# Patient Record
Sex: FEMALE | Race: ASIAN | NOT HISPANIC OR LATINO
[De-identification: names, ages, dates, MRNs, and addresses within clinical notes are randomized per-mention and may not be internally consistent; named-entity substitution may affect disease eponyms.]

---

## 2018-01-18 ENCOUNTER — APPOINTMENT (OUTPATIENT)
Dept: GYNECOLOGIC ONCOLOGY | Facility: CLINIC | Age: 53
End: 2018-01-18
Payer: MEDICAID

## 2018-01-18 VITALS
DIASTOLIC BLOOD PRESSURE: 98 MMHG | WEIGHT: 190 LBS | HEIGHT: 64 IN | BODY MASS INDEX: 32.44 KG/M2 | SYSTOLIC BLOOD PRESSURE: 169 MMHG | HEART RATE: 100 BPM

## 2018-01-18 DIAGNOSIS — Z86.79 PERSONAL HISTORY OF OTHER DISEASES OF THE CIRCULATORY SYSTEM: ICD-10-CM

## 2018-01-18 PROCEDURE — 99205 OFFICE O/P NEW HI 60 MIN: CPT

## 2018-01-18 RX ORDER — LISINOPRIL AND HYDROCHLOROTHIAZIDE TABLETS 20; 12.5 MG/1; MG/1
20-12.5 TABLET ORAL
Qty: 30 | Refills: 0 | Status: ACTIVE | COMMUNITY
Start: 2017-09-25

## 2018-01-18 RX ORDER — CHROMIUM 200 MCG
1000 TABLET ORAL
Qty: 30 | Refills: 0 | Status: ACTIVE | COMMUNITY
Start: 2017-09-25

## 2018-01-26 ENCOUNTER — LABORATORY RESULT (OUTPATIENT)
Age: 53
End: 2018-01-26

## 2018-06-07 ENCOUNTER — APPOINTMENT (OUTPATIENT)
Dept: GYNECOLOGIC ONCOLOGY | Facility: CLINIC | Age: 53
End: 2018-06-07
Payer: MEDICAID

## 2018-06-07 VITALS
WEIGHT: 190 LBS | HEART RATE: 105 BPM | SYSTOLIC BLOOD PRESSURE: 160 MMHG | HEIGHT: 64 IN | BODY MASS INDEX: 32.44 KG/M2 | DIASTOLIC BLOOD PRESSURE: 94 MMHG

## 2018-06-07 PROCEDURE — 58100 BIOPSY OF UTERUS LINING: CPT

## 2018-06-07 PROCEDURE — 99213 OFFICE O/P EST LOW 20 MIN: CPT | Mod: 25

## 2018-06-19 LAB — CORE LAB BIOPSY: NORMAL

## 2018-09-12 ENCOUNTER — APPOINTMENT (OUTPATIENT)
Dept: GYNECOLOGIC ONCOLOGY | Facility: CLINIC | Age: 53
End: 2018-09-12
Payer: MEDICAID

## 2018-09-12 VITALS
HEART RATE: 101 BPM | HEIGHT: 64 IN | BODY MASS INDEX: 32.33 KG/M2 | SYSTOLIC BLOOD PRESSURE: 123 MMHG | WEIGHT: 189.38 LBS | DIASTOLIC BLOOD PRESSURE: 81 MMHG

## 2018-09-12 PROCEDURE — 99213 OFFICE O/P EST LOW 20 MIN: CPT | Mod: 25

## 2018-09-12 PROCEDURE — 58100 BIOPSY OF UTERUS LINING: CPT

## 2018-09-19 LAB — CORE LAB BIOPSY: NORMAL

## 2018-10-22 ENCOUNTER — RX RENEWAL (OUTPATIENT)
Age: 53
End: 2018-10-22

## 2019-04-04 ENCOUNTER — APPOINTMENT (OUTPATIENT)
Dept: GYNECOLOGIC ONCOLOGY | Facility: CLINIC | Age: 54
End: 2019-04-04
Payer: MEDICAID

## 2019-04-04 VITALS
HEART RATE: 91 BPM | SYSTOLIC BLOOD PRESSURE: 151 MMHG | BODY MASS INDEX: 32.52 KG/M2 | HEIGHT: 64 IN | WEIGHT: 190.5 LBS | DIASTOLIC BLOOD PRESSURE: 89 MMHG

## 2019-04-04 DIAGNOSIS — N85.01 BENIGN ENDOMETRIAL HYPERPLASIA: ICD-10-CM

## 2019-04-04 PROCEDURE — 99213 OFFICE O/P EST LOW 20 MIN: CPT | Mod: 25

## 2019-04-04 PROCEDURE — 58100 BIOPSY OF UTERUS LINING: CPT

## 2019-04-10 LAB — CORE LAB BIOPSY: NORMAL

## 2022-03-29 ENCOUNTER — APPOINTMENT (OUTPATIENT)
Dept: UROGYNECOLOGY | Facility: CLINIC | Age: 57
End: 2022-03-29
Payer: MEDICAID

## 2022-03-29 VITALS
SYSTOLIC BLOOD PRESSURE: 136 MMHG | HEIGHT: 65 IN | TEMPERATURE: 97.3 F | DIASTOLIC BLOOD PRESSURE: 80 MMHG | BODY MASS INDEX: 31.65 KG/M2 | WEIGHT: 190 LBS

## 2022-03-29 DIAGNOSIS — Z82.49 FAMILY HISTORY OF ISCHEMIC HEART DISEASE AND OTHER DISEASES OF THE CIRCULATORY SYSTEM: ICD-10-CM

## 2022-03-29 DIAGNOSIS — N89.8 OTHER SPECIFIED NONINFLAMMATORY DISORDERS OF VAGINA: ICD-10-CM

## 2022-03-29 PROCEDURE — 99203 OFFICE O/P NEW LOW 30 MIN: CPT

## 2022-03-29 NOTE — HISTORY OF PRESENT ILLNESS
[Cystocele (Obstetric)] : no [Rectal Prolapse] : no [Stress Incontinence] : no [Unable To Restrain Bowel Movement] : no [Urinary Frequency] : no [Feelings Of Urinary Urgency] : no [x1] : nocturia once nightly [Urinary Tract Infection] : no [Constipation Obstructed Defecation] : no [Vaginal Pain] : no [] : no [FreeTextEntry1] : Christie presents today for evaluation of periurethral cyst. Patient states that she first noticed the cyst 1 month ago while showering. Denies any pain, dysuria, vaginal burning, pressure, irritation. States that the cyst grew in size. Was seen by her GYN and referred to this clinic. Denies hx of STI's. Currently sexually active without dyspareunia. Denies stress/urge incontinence, urge symptoms, or pelvic pressure or bulge.

## 2022-03-29 NOTE — PHYSICAL EXAM
[Chaperone Present] : A chaperone was present in the examining room during all aspects of the physical examination [Rate & Rhythm Regular] : ~T heart rate and rhythm were normal [Warm and Dry] : was warm and dry to touch [Labia Majora] : were normal [Labia Minora] : were normal [2] : 2 [Normal] : normal [No Acute Distress] : in no acute distress [Well developed] : well developed [Well Nourished] : ~L well nourished [Normal Appearance] : ~T the appearance of the neck was abnormal [Mass (___ Cm)] : no ~M [unfilled] abdominal mass was palpated [Tenderness] : ~T no ~M abdominal tenderness observed [Distended] : not distended [FreeTextEntry4] : + 1cm x 1cm in size vaginal cyst inferior to the left under the pubic ramus distally towards the urethral meatus [de-identified] : no prolapse

## 2022-04-27 ENCOUNTER — APPOINTMENT (OUTPATIENT)
Dept: MRI IMAGING | Facility: CLINIC | Age: 57
End: 2022-04-27
Payer: MEDICAID

## 2022-04-27 ENCOUNTER — OUTPATIENT (OUTPATIENT)
Dept: OUTPATIENT SERVICES | Facility: HOSPITAL | Age: 57
LOS: 1 days | End: 2022-04-27
Payer: MEDICAID

## 2022-04-27 DIAGNOSIS — N89.8 OTHER SPECIFIED NONINFLAMMATORY DISORDERS OF VAGINA: ICD-10-CM

## 2022-04-27 DIAGNOSIS — Z00.8 ENCOUNTER FOR OTHER GENERAL EXAMINATION: ICD-10-CM

## 2022-04-27 PROCEDURE — 72197 MRI PELVIS W/O & W/DYE: CPT

## 2022-04-27 PROCEDURE — 72197 MRI PELVIS W/O & W/DYE: CPT | Mod: 26

## 2022-04-27 PROCEDURE — A9585: CPT

## 2022-05-02 ENCOUNTER — NON-APPOINTMENT (OUTPATIENT)
Age: 57
End: 2022-05-02

## 2022-05-12 ENCOUNTER — APPOINTMENT (OUTPATIENT)
Dept: UROLOGY | Facility: CLINIC | Age: 57
End: 2022-05-12
Payer: MEDICAID

## 2022-05-12 VITALS
WEIGHT: 182 LBS | RESPIRATION RATE: 18 BRPM | HEART RATE: 94 BPM | BODY MASS INDEX: 30.32 KG/M2 | DIASTOLIC BLOOD PRESSURE: 102 MMHG | SYSTOLIC BLOOD PRESSURE: 154 MMHG | HEIGHT: 65 IN

## 2022-05-12 PROCEDURE — 99204 OFFICE O/P NEW MOD 45 MIN: CPT

## 2022-05-12 RX ORDER — MEDROXYPROGESTERONE ACETATE 10 MG/1
10 TABLET ORAL
Qty: 48 | Refills: 0 | Status: COMPLETED | COMMUNITY
Start: 2018-01-30 | End: 2022-05-12

## 2022-05-22 NOTE — PHYSICAL EXAM
[General Appearance - Well Developed] : well developed [General Appearance - Well Nourished] : well nourished [Normal Appearance] : normal appearance [Well Groomed] : well groomed [General Appearance - In No Acute Distress] : no acute distress [Edema] : no peripheral edema [Respiration, Rhythm And Depth] : normal respiratory rhythm and effort [Exaggerated Use Of Accessory Muscles For Inspiration] : no accessory muscle use [Abdomen Soft] : soft [Abdomen Tenderness] : non-tender [Costovertebral Angle Tenderness] : no ~M costovertebral angle tenderness [Urinary Bladder Findings] : the bladder was normal on palpation [Normal Station and Gait] : the gait and station were normal for the patient's age [] : no rash [No Focal Deficits] : no focal deficits [Oriented To Time, Place, And Person] : oriented to person, place, and time [Affect] : the affect was normal [Mood] : the mood was normal [Not Anxious] : not anxious [FreeTextEntry1] : Solid palpable mass in the left periurethral/anterior vagina, non tender, firm. [No Palpable Adenopathy] : no palpable adenopathy

## 2022-05-22 NOTE — ASSESSMENT
[FreeTextEntry1] : Exam findings reviewed with the patient and her .\par MRI images reviewed.\par Recommend to undergo transvaginal biopsy. Will discuss whether to do in the office versus under sedation.\par Discussed possible etiology including both benign and malignant lesions.

## 2022-05-22 NOTE — HISTORY OF PRESENT ILLNESS
[FreeTextEntry1] : Patient is a 56 year old woman presents to the office today for a urethral/vaginal mass\par \par Reports that GYN palpated a mass, nontender.\par Denies any bothersome urinary symptoms. Denies any dysuria, dyspareunia or dribbling. Denies gross hematuria. No recurrent UTI.  No urethral discharge.  No prior pelvic surgery.\par \par 4/27/2022 MRI pelvis w/wo contrast demonstrated 3 x 2.7 x 2 enhancing solid lesion adjacent to the urethral meatus.

## 2022-06-02 DIAGNOSIS — N36.8 OTHER SPECIFIED DISORDERS OF URETHRA: ICD-10-CM

## 2022-06-08 ENCOUNTER — OUTPATIENT (OUTPATIENT)
Dept: OUTPATIENT SERVICES | Facility: HOSPITAL | Age: 57
LOS: 1 days | End: 2022-06-08
Payer: MEDICAID

## 2022-06-08 VITALS
WEIGHT: 199.96 LBS | SYSTOLIC BLOOD PRESSURE: 168 MMHG | OXYGEN SATURATION: 97 % | HEIGHT: 65 IN | TEMPERATURE: 99 F | HEART RATE: 100 BPM | DIASTOLIC BLOOD PRESSURE: 100 MMHG | RESPIRATION RATE: 16 BRPM

## 2022-06-08 DIAGNOSIS — N36.8 OTHER SPECIFIED DISORDERS OF URETHRA: ICD-10-CM

## 2022-06-08 DIAGNOSIS — D25.9 LEIOMYOMA OF UTERUS, UNSPECIFIED: Chronic | ICD-10-CM

## 2022-06-08 DIAGNOSIS — R52 PAIN, UNSPECIFIED: ICD-10-CM

## 2022-06-08 DIAGNOSIS — I10 ESSENTIAL (PRIMARY) HYPERTENSION: ICD-10-CM

## 2022-06-08 LAB
A1C WITH ESTIMATED AVERAGE GLUCOSE RESULT: 5.8 % — HIGH (ref 4–5.6)
ANION GAP SERPL CALC-SCNC: 13 MMOL/L — SIGNIFICANT CHANGE UP (ref 7–14)
BUN SERPL-MCNC: 10 MG/DL — SIGNIFICANT CHANGE UP (ref 7–23)
CALCIUM SERPL-MCNC: 10.4 MG/DL — SIGNIFICANT CHANGE UP (ref 8.4–10.5)
CHLORIDE SERPL-SCNC: 100 MMOL/L — SIGNIFICANT CHANGE UP (ref 98–107)
CO2 SERPL-SCNC: 26 MMOL/L — SIGNIFICANT CHANGE UP (ref 22–31)
CREAT SERPL-MCNC: 0.88 MG/DL — SIGNIFICANT CHANGE UP (ref 0.5–1.3)
EGFR: 77 ML/MIN/1.73M2 — SIGNIFICANT CHANGE UP
ESTIMATED AVERAGE GLUCOSE: 120 — SIGNIFICANT CHANGE UP
GLUCOSE SERPL-MCNC: 116 MG/DL — HIGH (ref 70–99)
HCG SERPL-ACNC: <5 MIU/ML — SIGNIFICANT CHANGE UP
HCT VFR BLD CALC: 41.6 % — SIGNIFICANT CHANGE UP (ref 34.5–45)
HGB BLD-MCNC: 13.4 G/DL — SIGNIFICANT CHANGE UP (ref 11.5–15.5)
MCHC RBC-ENTMCNC: 27.2 PG — SIGNIFICANT CHANGE UP (ref 27–34)
MCHC RBC-ENTMCNC: 32.2 GM/DL — SIGNIFICANT CHANGE UP (ref 32–36)
MCV RBC AUTO: 84.4 FL — SIGNIFICANT CHANGE UP (ref 80–100)
NRBC # BLD: 0 /100 WBCS — SIGNIFICANT CHANGE UP
NRBC # FLD: 0 K/UL — SIGNIFICANT CHANGE UP
PLATELET # BLD AUTO: 303 K/UL — SIGNIFICANT CHANGE UP (ref 150–400)
POTASSIUM SERPL-MCNC: 3.5 MMOL/L — SIGNIFICANT CHANGE UP (ref 3.5–5.3)
POTASSIUM SERPL-SCNC: 3.5 MMOL/L — SIGNIFICANT CHANGE UP (ref 3.5–5.3)
RBC # BLD: 4.93 M/UL — SIGNIFICANT CHANGE UP (ref 3.8–5.2)
RBC # FLD: 13.3 % — SIGNIFICANT CHANGE UP (ref 10.3–14.5)
SODIUM SERPL-SCNC: 139 MMOL/L — SIGNIFICANT CHANGE UP (ref 135–145)
WBC # BLD: 7.62 K/UL — SIGNIFICANT CHANGE UP (ref 3.8–10.5)
WBC # FLD AUTO: 7.62 K/UL — SIGNIFICANT CHANGE UP (ref 3.8–10.5)

## 2022-06-08 PROCEDURE — 93010 ELECTROCARDIOGRAM REPORT: CPT

## 2022-06-08 RX ORDER — SODIUM CHLORIDE 9 MG/ML
1000 INJECTION, SOLUTION INTRAVENOUS
Refills: 0 | Status: DISCONTINUED | OUTPATIENT
Start: 2022-06-14 | End: 2022-06-28

## 2022-06-08 RX ORDER — LISINOPRIL/HYDROCHLOROTHIAZIDE 10-12.5 MG
1 TABLET ORAL
Qty: 0 | Refills: 0 | DISCHARGE

## 2022-06-08 NOTE — H&P PST ADULT - NS PRO LAST MENSTRUAL DATE
Feb. 2022 Feb. 2022; sent [facility]; given urine cup to bring specimen the am of surgery

## 2022-06-08 NOTE — H&P PST ADULT - NSICDXPASTMEDICALHX_GEN_ALL_CORE_FT
PAST MEDICAL HISTORY:  HTN (hypertension)     Pain abiola - urethral/ vaginal mass in June 2022    Uterine fibroid     Vitamin D deficiency      PAST MEDICAL HISTORY:  History of prediabetes     HTN (hypertension)     Pain abiola - urethral/ vaginal mass in June 2022    Uterine fibroid     Vitamin D deficiency

## 2022-06-08 NOTE — H&P PST ADULT - PROBLEM SELECTOR PLAN 1
This is a 58 y/o female who is scheduled for biopsy of abiola-urethral/vaginal mass on 6-14-22  * Order covid-19 PCR and gave patient sheet with sites and numbers  * Given preop instructions

## 2022-06-08 NOTE — H&P PST ADULT - HISTORY OF PRESENT ILLNESS
This is  a 56 y/o female who presents with abiola-urethral/vaginal mass. Evaluated by MD who recommended intervention. Scheduled for biopsy of abiola-urethral/vaginal mass

## 2022-06-08 NOTE — H&P PST ADULT - PROBLEM SELECTOR PLAN 2
Await medical evaluation with pcp due to elevated BP at PAST Office--notified surgeon via email  * Instructed patient to take normal am dose of lisinopril-HCTZ the am of surgery

## 2022-06-13 ENCOUNTER — TRANSCRIPTION ENCOUNTER (OUTPATIENT)
Age: 57
End: 2022-06-13

## 2022-06-13 NOTE — ASU PATIENT PROFILE, ADULT - FALL HARM RISK - UNIVERSAL INTERVENTIONS
Bed in lowest position, wheels locked, appropriate side rails in place/Call bell, personal items and telephone in reach/Instruct patient to call for assistance before getting out of bed or chair/Non-slip footwear when patient is out of bed/[place] to call system/Physically safe environment - no spills, clutter or unnecessary equipment/Purposeful Proactive Rounding/Room/bathroom lighting operational, light cord in reach

## 2022-06-13 NOTE — ASU PATIENT PROFILE, ADULT - NSICDXPASTMEDICALHX_GEN_ALL_CORE_FT
PAST MEDICAL HISTORY:  History of prediabetes     HTN (hypertension)     Pain abiola - urethral/ vaginal mass in June 2022    Uterine fibroid     Vitamin D deficiency

## 2022-06-13 NOTE — ASU PATIENT PROFILE, ADULT - PAIN SCALE PREFERRED, PROFILE
Date & Time: 4/13/2024, 1:51 PM  Patient: Erasmo Vega  Encounter Provider(s):    Terrance Garcia APRN       To Whom It May Concern:    Erasmo Vega was seen and treated in our department on 4/13/2024. He can return to school on Monday 04/15/2024.    If you have any questions or concerns, please do not hesitate to call.      HOWARD SilvermanP-BC  _____________________________  Physician/APC Signature            numerical [phone]

## 2022-06-14 ENCOUNTER — OUTPATIENT (OUTPATIENT)
Dept: OUTPATIENT SERVICES | Facility: HOSPITAL | Age: 57
LOS: 1 days | Discharge: HOME | End: 2022-06-14
Payer: MEDICAID

## 2022-06-14 ENCOUNTER — RESULT REVIEW (OUTPATIENT)
Age: 57
End: 2022-06-14

## 2022-06-14 ENCOUNTER — TRANSCRIPTION ENCOUNTER (OUTPATIENT)
Age: 57
End: 2022-06-14

## 2022-06-14 ENCOUNTER — APPOINTMENT (OUTPATIENT)
Dept: UROLOGY | Facility: HOSPITAL | Age: 57
End: 2022-06-14

## 2022-06-14 VITALS
DIASTOLIC BLOOD PRESSURE: 72 MMHG | RESPIRATION RATE: 18 BRPM | OXYGEN SATURATION: 100 % | HEART RATE: 80 BPM | SYSTOLIC BLOOD PRESSURE: 125 MMHG

## 2022-06-14 VITALS
OXYGEN SATURATION: 99 % | DIASTOLIC BLOOD PRESSURE: 86 MMHG | WEIGHT: 199.96 LBS | HEART RATE: 91 BPM | TEMPERATURE: 98 F | RESPIRATION RATE: 14 BRPM | HEIGHT: 65 IN | SYSTOLIC BLOOD PRESSURE: 157 MMHG

## 2022-06-14 DIAGNOSIS — N89.8 OTHER SPECIFIED NONINFLAMMATORY DISORDERS OF VAGINA: ICD-10-CM

## 2022-06-14 DIAGNOSIS — N36.8 OTHER SPECIFIED DISORDERS OF URETHRA: ICD-10-CM

## 2022-06-14 DIAGNOSIS — D25.9 LEIOMYOMA OF UTERUS, UNSPECIFIED: Chronic | ICD-10-CM

## 2022-06-14 LAB — HCG UR QL: NEGATIVE — SIGNIFICANT CHANGE UP

## 2022-06-14 PROCEDURE — 88305 TISSUE EXAM BY PATHOLOGIST: CPT | Mod: 26

## 2022-06-14 PROCEDURE — 88342 IMHCHEM/IMCYTCHM 1ST ANTB: CPT | Mod: 26

## 2022-06-14 PROCEDURE — 88341 IMHCHEM/IMCYTCHM EA ADD ANTB: CPT | Mod: 26

## 2022-06-14 PROCEDURE — 53200 BIOPSY OF URETHRA: CPT

## 2022-06-14 RX ORDER — ONDANSETRON 8 MG/1
4 TABLET, FILM COATED ORAL ONCE
Refills: 0 | Status: DISCONTINUED | OUTPATIENT
Start: 2022-06-14 | End: 2022-06-28

## 2022-06-14 RX ORDER — SODIUM CHLORIDE 9 MG/ML
1000 INJECTION, SOLUTION INTRAVENOUS
Refills: 0 | Status: DISCONTINUED | OUTPATIENT
Start: 2022-06-14 | End: 2022-06-28

## 2022-06-14 RX ORDER — HYDROMORPHONE HYDROCHLORIDE 2 MG/ML
0.5 INJECTION INTRAMUSCULAR; INTRAVENOUS; SUBCUTANEOUS
Refills: 0 | Status: DISCONTINUED | OUTPATIENT
Start: 2022-06-14 | End: 2022-06-14

## 2022-06-14 RX ADMIN — SODIUM CHLORIDE 100 MILLILITER(S): 9 INJECTION, SOLUTION INTRAVENOUS at 12:01

## 2022-06-14 NOTE — ASU DISCHARGE PLAN (ADULT/PEDIATRIC) - NURSING INSTRUCTIONS
Please report any signs and symptoms of infection including Fever (Temp >101 or >100.4 if GYN procedure), uncontrollable nausea, vomiting, diarrhea, chills & inability to urinate. Shower with soap & water when appropriate, pat dry with clean towel & no ointments, creams, powders or lotions on incisions unless okayed by MD. Please report any puss or increased drainage from incision sites, or if redness develops and spreads around sites. Please practice good hand hygiene especially after using the bathroom. Follow up with all MD appointments and take medication(s) as prescribed  Nothing in vagina, no intercourse, no douching, no tampons, no tub baths, and no swimming for 2 weeks or until cleared by M.D.

## 2022-06-14 NOTE — BRIEF OPERATIVE NOTE - OPERATION/FINDINGS
Slightly left of midline mass palpable between urethral meatus and pubis. Biopsied with trucut needle x3. Frozen favors benign pathology.

## 2022-06-14 NOTE — BRIEF OPERATIVE NOTE - NSICDXBRIEFOPLAUNCH_GEN_ALL_CORE
Detail Level: Simple
Additional Notes: Patient consent was obtained to proceed with the visit and recommended plan of care after discussion of all risks and benefits, including the risks of COVID-19 exposure.
<--- Click to Launch ICDx for PreOp, PostOp and Procedure

## 2022-06-14 NOTE — ASU DISCHARGE PLAN (ADULT/PEDIATRIC) - FOLLOW UP APPOINTMENTS
[facility] may also call Recovery Room (PACU) 24/7 @ [phone]/[facility]

## 2022-06-14 NOTE — ASU DISCHARGE PLAN (ADULT/PEDIATRIC) - ASU DC SPECIAL INSTRUCTIONSFT
Your initial biopsy results showed no concerning findings. Final results will take approximately 7-10 business days.    Dr. Montoya's office will call with final results.    Follow-up per routine.

## 2022-06-14 NOTE — ASU DISCHARGE PLAN (ADULT/PEDIATRIC) - NS MD DC FALL RISK RISK
For information on Fall & Injury Prevention, visit: [email or web address] OR  [email or web address] OR  https://www.cdc.gov/steadi/patient.html

## 2022-06-14 NOTE — ASU DISCHARGE PLAN (ADULT/PEDIATRIC) - CARE PROVIDER_API CALL
Rick Montoya)  Urology  [address] [place] [address], [place] [address]  Phone: [phone]  Fax: [phone]  Follow Up Time:

## 2022-06-14 NOTE — ASU DISCHARGE PLAN (ADULT/PEDIATRIC) - CALL YOUR DOCTOR IF YOU HAVE ANY OF THE FOLLOWING:
Bleeding that does not stop/Fever greater than (need to indicate Fahrenheit or Celsius) Bleeding that does not stop/Pain not relieved by Medications/Fever greater than (need to indicate Fahrenheit or Celsius)/Wound/Surgical Site with redness, or foul smelling discharge or pus/Nausea and vomiting that does not stop/Unable to urinate/Inability to tolerate liquids or foods/Increased irritability or sluggishness

## 2022-06-15 LAB — GLUCOSE BLDC GLUCOMTR-MCNC: 110 MG/DL — HIGH (ref 70–99)

## 2022-06-23 LAB — SURGICAL PATHOLOGY STUDY: SIGNIFICANT CHANGE UP

## 2023-02-16 PROBLEM — R52 PAIN, UNSPECIFIED: Chronic | Status: ACTIVE | Noted: 2022-06-08

## 2023-02-16 PROBLEM — Z87.898 PERSONAL HISTORY OF OTHER SPECIFIED CONDITIONS: Chronic | Status: ACTIVE | Noted: 2022-06-08

## 2023-03-24 ENCOUNTER — APPOINTMENT (OUTPATIENT)
Dept: PULMONOLOGY | Facility: CLINIC | Age: 58
End: 2023-03-24
Payer: MEDICAID

## 2023-03-24 VITALS
BODY MASS INDEX: 30.99 KG/M2 | SYSTOLIC BLOOD PRESSURE: 139 MMHG | WEIGHT: 186 LBS | HEART RATE: 109 BPM | HEIGHT: 65 IN | DIASTOLIC BLOOD PRESSURE: 89 MMHG

## 2023-03-24 PROCEDURE — 94729 DIFFUSING CAPACITY: CPT

## 2023-03-24 PROCEDURE — 94726 PLETHYSMOGRAPHY LUNG VOLUMES: CPT

## 2023-03-24 PROCEDURE — 99205 OFFICE O/P NEW HI 60 MIN: CPT | Mod: 25

## 2023-03-24 PROCEDURE — 94060 EVALUATION OF WHEEZING: CPT

## 2023-03-24 PROCEDURE — ZZZZZ: CPT

## 2023-03-24 NOTE — CONSULT LETTER
[Dear  ___] : Dear  [unfilled], [Consult Letter:] : I had the pleasure of evaluating your patient, [unfilled]. [Please see my note below.] : Please see my note below. [Sincerely,] : Sincerely, [FreeTextEntry3] : John Sadler MD, FACP, FCCP\par Division of Pulmonary, Critical Care, and Sleep Medicine\par Associate Professor of Medicine \par [employer] at [facility]\par

## 2023-03-24 NOTE — REVIEW OF SYSTEMS
[Ear Disturbance] : ear disturbance [Nasal Congestion] : nasal congestion [Postnasal Drip] : postnasal drip [Cough] : cough [Wheezing] : wheezing [Fever] : no fever [Fatigue] : no fatigue [EDS] : no EDS [Chills] : no chills [Sore Throat] : no sore throat [Eye Irritation] : no eye irritation [Sinus Problems] : no sinus problems [Hemoptysis] : no hemoptysis [Chest Tightness] : no chest tightness [Frequent URIs] : no frequent URIs [Sputum] : no sputum [Dyspnea] : no dyspnea [Claudication] : no claudication [Edema] : no edema [Leg Cramps] : no leg cramps [Watery Eyes] : no watery eyes [Itchy Eyes] : no itchy eyes [GERD] : no gerd [Abdominal Pain] : no abdominal pain [Nausea] : no nausea [Vomiting] : no vomiting [Dysuria] : no dysuria [Arthralgias] : no arthralgias [Myalgias] : no myalgias [Rash] : no rash [Headache] : no headache [Focal Weakness] : no focal weakness [Seizures] : no seizures [Head Injury] : no head injury [Depression] : no depression [Anxiety] : no anxiety [Diabetes] : no diabetes [Obesity] : no obesity

## 2023-03-24 NOTE — PHYSICAL EXAM
[No Acute Distress] : no acute distress [Well Nourished] : well nourished [Well Groomed] : well groomed [No Deformities] : no deformities [Well Developed] : well developed [Normal Oropharynx] : normal oropharynx [Normal Appearance] : normal appearance [Supple] : supple [No Neck Mass] : no neck mass [Normal Rate/Rhythm] : normal rate/rhythm [Normal S1, S2] : normal s1, s2 [No Resp Distress] : no resp distress [No Acc Muscle Use] : no acc muscle use [Benign] : benign [Soft] : soft [Normal Gait] : normal gait [No Clubbing] : no clubbing [No Cyanosis] : no cyanosis [No Edema] : no edema [FROM] : FROM [Normal Color/ Pigmentation] : normal color/ pigmentation [No Focal Deficits] : no focal deficits [Oriented x3] : oriented x3 [Normal Affect] : normal affect [TextBox_11] : NCAT, EOMI, anicteric, MMM, nares clear, trachea midline, no thrush [TextBox_68] : expiratory wheeze, no rhonchi, no egophany

## 2023-03-24 NOTE — ASSESSMENT
[FreeTextEntry1] : 57F HTN presenting for initial pulmonary evaluation of cough which has been present for the last 2-3 months. She had PFTs which demonstrate a significant bronchodilator response in FEV1 and FVC\par \par #Cough - possibly due to cough variant asthma or reactive airway disease triggered by initial viral URI - will start bronchodilator therapy. \par - CXR without infiltrates - if cough persists after therapy may need repeat imaging\par - PFTs noted\par - Continue Flonase for component of postnasal drip\par - If no improvement with therapy and no imaging abnormalities may need to consider whether Lisinopril is contributing to cough though patient has been on this therapy for many years\par \par #Asthma - suspected cough variant asthma - significant bronchodilator response noted\par - Denies allergies or triggers at this point\par - Will start Trelegy daily - sample provided and patient instructed on use. Patient will notify our office if unable to obtain bronchodilator\par - Albuterol rescue inhaler for PRN use\par - Does not require systemic steroids at this time\par - If symptoms persist despite bronchodilator therapy will check serologic workup at next visit\par \par #Snoring - patient does admit to snoring but denies daytime somnolence\par - I have referred her to the [facility] for a diagnostic home sleep apnea test\par \par #Health Maintenance\par Spirometry: Reviewed \par Smoking status: Never smoker \par Pneumococcal vaccination status: N/A \par [place] Sleepiness Scale: 2 (3/24/23)\par \par #Follow-up in 2-4 months or sooner as needed\par - All questions answered\par \par \par The above plan was discussed with TARA PANDYA  in detail. Patient verbalized understanding and agrees with plan as detailed above. Patient was provided education and counselling on current diagnosis/symptoms, diagnostic work up, treatment options and potential side effects of any prescribed therapy/therapies. TARA  was advised to call our clinic at [phone] for any new or worsening symptoms, or with any questions or concerns. In case of acute onset of respiratory symptoms or worsening presentation, patient was advised to present to nearest emergency room for further evaluation. TARA  expressed understanding and all questions/concerns were addressed.\par \par

## 2023-03-24 NOTE — HISTORY OF PRESENT ILLNESS
[Never] : never [TextBox_4] : 57F HTN presenting for initial pulmonary evaluation of cough which has been present for the last 2-3 months. The cough initially presented after having a flu-like illness around New Years that many of her family members had as well. She initially noted a scratchy throat and runny nose and then progressed to cough.\par \par The cough persists and is a dry cough. It comes at all times of the day and she has not noted any alleviating factors. She has been given a trial of Flonase without much relief. She uses the Flonase once daily. She has not noted any change in her voice but does endorse throat clearing. Given her symptoms she had a CXR done January 16, 2023 which did not note any acute cardiopulmonary pathology (done at [facility]).\par \par She has no prior history of lung disease. She denies any family history of lung disease. Her cough is dry and she does not note sputum production or hemoptysis. She denies chest pain or palpitations. She denies nausea, vomiting, or abdominal pain. She denies any other changes to her health. She does not use any new medications aside from Flonase. She has been on Lisinopril for a long time.\par \par She is a never smoker. She is originally from [facility] but has been in the [place] for many years. She does not have any pets. 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 There is no pneumonia, pulmonary edema, pleural effusion, or pneumothorax. \par \par The cardiac silhouette is not enlarged.\par \par The pulmonary kiera and mediastinal contours are within normal limits. \par \par There is no gross evidence for thoracic vertebral compression fracture. No gross osseous abnormality.  \par \par IMPRESSION:  No acute cardiopulmonary pathology.

## 2023-05-03 ENCOUNTER — RX RENEWAL (OUTPATIENT)
Age: 58
End: 2023-05-03

## 2023-06-13 ENCOUNTER — RX CHANGE (OUTPATIENT)
Age: 58
End: 2023-06-13

## 2023-06-14 ENCOUNTER — RX CHANGE (OUTPATIENT)
Age: 58
End: 2023-06-14

## 2023-06-19 ENCOUNTER — APPOINTMENT (OUTPATIENT)
Dept: PULMONOLOGY | Facility: CLINIC | Age: 58
End: 2023-06-19
Payer: MEDICAID

## 2023-06-19 VITALS
OXYGEN SATURATION: 99 % | SYSTOLIC BLOOD PRESSURE: 154 MMHG | DIASTOLIC BLOOD PRESSURE: 97 MMHG | WEIGHT: 189.38 LBS | BODY MASS INDEX: 31.55 KG/M2 | RESPIRATION RATE: 15 BRPM | HEART RATE: 88 BPM | TEMPERATURE: 97.5 F | HEIGHT: 65 IN

## 2023-06-19 DIAGNOSIS — J45.991 COUGH VARIANT ASTHMA: ICD-10-CM

## 2023-06-19 DIAGNOSIS — Z00.00 ENCOUNTER FOR GENERAL ADULT MEDICAL EXAMINATION W/OUT ABNORMAL FINDINGS: ICD-10-CM

## 2023-06-19 DIAGNOSIS — R06.83 SNORING: ICD-10-CM

## 2023-06-19 PROCEDURE — 99213 OFFICE O/P EST LOW 20 MIN: CPT

## 2023-06-19 RX ORDER — FLUTICASONE FUROATE, UMECLIDINIUM BROMIDE AND VILANTEROL TRIFENATATE 100; 62.5; 25 UG/1; UG/1; UG/1
100-62.5-25 POWDER RESPIRATORY (INHALATION) DAILY
Qty: 1 | Refills: 6 | Status: ACTIVE | COMMUNITY
Start: 2023-03-24 | End: 1900-01-01

## 2023-06-19 RX ORDER — ALBUTEROL SULFATE 90 UG/1
108 (90 BASE) INHALANT RESPIRATORY (INHALATION)
Qty: 1 | Refills: 6 | Status: ACTIVE | COMMUNITY
Start: 2023-03-24 | End: 1900-01-01

## 2023-06-19 RX ORDER — FLUTICASONE PROPIONATE 50 UG/1
50 SPRAY, METERED NASAL TWICE DAILY
Qty: 1 | Refills: 3 | Status: DISCONTINUED | COMMUNITY
Start: 2023-03-24 | End: 2023-06-19

## 2023-06-19 NOTE — PHYSICAL EXAM
[No Acute Distress] : no acute distress [Well Nourished] : well nourished [Well Groomed] : well groomed [No Deformities] : no deformities [Well Developed] : well developed [Normal Oropharynx] : normal oropharynx [Normal Appearance] : normal appearance [Supple] : supple [No Neck Mass] : no neck mass [Normal Rate/Rhythm] : normal rate/rhythm [Normal S1, S2] : normal s1, s2 [No Resp Distress] : no resp distress [No Acc Muscle Use] : no acc muscle use [Benign] : benign [Soft] : soft [Normal Gait] : normal gait [No Clubbing] : no clubbing [No Cyanosis] : no cyanosis [No Edema] : no edema [FROM] : FROM [Normal Color/ Pigmentation] : normal color/ pigmentation [No Focal Deficits] : no focal deficits [Oriented x3] : oriented x3 [Normal Affect] : normal affect [Normal Rhythm and Effort] : normal rhythm and effort [TextBox_11] : NCAT, EOMI, anicteric, MMM, nares clear, trachea midline, no thrush [TextBox_68] : no wheeze or rhonchi, good inspiratory effort

## 2023-06-19 NOTE — HISTORY OF PRESENT ILLNESS
[Never] : never [Difficulty Breathing During Exertion] : dyspnea on exertion [Feelings Of Weakness On Exertion] : exercise intolerance [Cough] : coughing [Wheezing] : wheezing [Nasal Passage Blockage (Stuffiness)] : edema [Nonspecific Pain, Swelling, And Stiffness] : chest pain [Fever] : fever [0  -  Nothing at all] : 0, nothing at all [TextBox_4] : 57F HTN and asthma presenting for follow-up pulmonary evaluation of cough. She was diagnosed with cough-variant asthma on her prior visit and has had improvement with bronchodilator therapy.\par \par - Doing well on Trelegy daily\par - Very rare albuterol usage\par - Denies daytime fatigue or somnolence.  not bothered by her snoring\par - Not using Flonase\par - No complaints. In good spirits.\par - Denies cough, sputum production, or hemoptysis\par - New PMD: Dr. Rocio Guerra\par \par PFTs: 3/24/23 - post BD - FEV1 2.13L (77%), FVC 2.66L (75%), FEV1/FVC 80%, FEF 25-75 (54-71%), TLC 65%, RV/TLC 34%, DLCO 84% - significant BD response in FEV1 and FVC\par \par From initial visit:\par The cough persists and is a dry cough. It comes at all times of the day and she has not noted any alleviating factors. She has been given a trial of Flonase without much relief. She uses the Flonase once daily. She has not noted any change in her voice but does endorse throat clearing. Given her symptoms she had a CXR done January 16, 2023 which did not note any acute cardiopulmonary pathology (done at [facility]).\par \par She has no prior history of lung disease. She denies any family history of lung disease. Her cough is dry and she does not note sputum production or hemoptysis. She denies chest pain or palpitations. She denies nausea, vomiting, or abdominal pain. She denies any other changes to her health. She does not use any new medications aside from Flonase. She has been on Lisinopril for a long time.\par \par She is a never smoker. She is originally from [place] [facility] but has been in the [place] for many years. She does not have any pets. 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 There is no pneumonia, pulmonary edema, pleural effusion, or pneumothorax. \par \par The cardiac silhouette is not enlarged.\par \par The pulmonary kiera and mediastinal contours are within normal limits. \par \par There is no gross evidence for thoracic vertebral compression fracture. No gross osseous abnormality.  \par \par IMPRESSION:  No acute cardiopulmonary pathology.

## 2023-06-19 NOTE — ASSESSMENT
[FreeTextEntry1] : 57F HTN and asthma presenting for follow-up pulmonary evaluation. She was initially seen in the setting of cough but has had resolution of her symptoms with bronchodilator use. \par - She is without complaints today and in good spirits\par \par #Asthma - cough has resolved with initiation of bronchodilator therapy\par - Continue Trelegy - refill sent to pharmacy\par - Albuterol PRN - has not required this recently\par - Prior CXR clear\par - Does not require systemic steroids at this time\par - If symptoms persist despite bronchodilator therapy will check serologic workup at next visit\par \par #Snoring - patient does admit to snoring but denies daytime somnolence\par - I have referred her to the [facility] for a diagnostic home sleep apnea test - she has deferred this testing for now\par \par #Health Maintenance\par Spirometry: Reviewed \par Smoking status: Never smoker \par Pneumococcal vaccination status: deferred - is a candidate based on asthma\par [place] Sleepiness Scale: 2 (3/24/23)\par \par #Follow-up in 6-12 months or sooner as needed\par - All questions answered\par \par \par The above plan was discussed with TARA RILEYDYANA  in detail. Patient verbalized understanding and agrees with plan as detailed above. Patient was provided education and counselling on current diagnosis/symptoms, diagnostic work up, treatment options and potential side effects of any prescribed therapy/therapies. TARA  was advised to call our clinic at [phone] for any new or worsening symptoms, or with any questions or concerns. In case of acute onset of respiratory symptoms or worsening presentation, patient was advised to present to nearest emergency room for further evaluation. TARA  expressed understanding and all questions/concerns were addressed.\par \par

## 2023-06-19 NOTE — CONSULT LETTER
[Dear  ___] : Dear  [unfilled], [Consult Letter:] : I had the pleasure of evaluating your patient, [unfilled]. [Please see my note below.] : Please see my note below. [Consult Closing:] : Thank you very much for allowing me to participate in the care of this patient.  If you have any questions, please do not hesitate to contact me. [Sincerely,] : Sincerely, [FreeTextEntry3] : John Sadler MD, FACP, FCCP\par Division of Pulmonary, Critical Care, and Sleep Medicine\par Associate Professor of Medicine \par [employer] at [facility]\par

## 2023-06-19 NOTE — REVIEW OF SYSTEMS
[Fever] : no fever [Fatigue] : no fatigue [EDS] : no EDS [Chills] : no chills [Ear Disturbance] : no ear disturbance [Sore Throat] : no sore throat [Eye Irritation] : no eye irritation [Nasal Congestion] : no nasal congestion [Postnasal Drip] : no postnasal drip [Sinus Problems] : no sinus problems [Cough] : no cough [Hemoptysis] : no hemoptysis [Chest Tightness] : no chest tightness [Frequent URIs] : no frequent URIs [Sputum] : no sputum [Dyspnea] : no dyspnea [Wheezing] : no wheezing [Claudication] : no claudication [Edema] : no edema [Leg Cramps] : no leg cramps [Watery Eyes] : no watery eyes [Itchy Eyes] : no itchy eyes [GERD] : no gerd [Abdominal Pain] : no abdominal pain [Nausea] : no nausea [Vomiting] : no vomiting [Dysuria] : no dysuria [Arthralgias] : no arthralgias [Myalgias] : no myalgias [Rash] : no rash [Headache] : no headache [Focal Weakness] : no focal weakness [Seizures] : no seizures [Head Injury] : no head injury [Depression] : no depression [Anxiety] : no anxiety [Diabetes] : no diabetes [Obesity] : no obesity

## 2024-10-07 ENCOUNTER — RX RENEWAL (OUTPATIENT)
Age: 59
End: 2024-10-07
